# Patient Record
Sex: FEMALE | Race: WHITE | Employment: UNEMPLOYED | URBAN - METROPOLITAN AREA
[De-identification: names, ages, dates, MRNs, and addresses within clinical notes are randomized per-mention and may not be internally consistent; named-entity substitution may affect disease eponyms.]

---

## 2019-03-15 ENCOUNTER — HOSPITAL ENCOUNTER (OUTPATIENT)
Dept: MAMMOGRAPHY | Age: 59
Discharge: HOME | End: 2019-03-17
Payer: COMMERCIAL

## 2019-03-15 DIAGNOSIS — Z12.39 BREAST CANCER SCREENING: ICD-10-CM

## 2019-03-15 PROCEDURE — 77063 BREAST TOMOSYNTHESIS BI: CPT

## 2020-06-19 ENCOUNTER — HOSPITAL ENCOUNTER (OUTPATIENT)
Dept: MAMMOGRAPHY | Age: 60
Discharge: HOME | End: 2020-06-21
Payer: COMMERCIAL

## 2020-06-19 PROCEDURE — 77063 BREAST TOMOSYNTHESIS BI: CPT

## 2024-04-10 PROBLEM — M81.6 LOCALIZED OSTEOPOROSIS WITHOUT CURRENT PATHOLOGICAL FRACTURE: Status: ACTIVE | Noted: 2024-04-10

## 2024-04-10 PROBLEM — Z01.419 WELL WOMAN EXAM WITH ROUTINE GYNECOLOGICAL EXAM: Status: ACTIVE | Noted: 2024-04-10

## 2024-04-10 NOTE — ASSESSMENT & PLAN NOTE
Pap is not indicated by history and age.  Mammogram is ordered.  DEXA is ordered.  Regular exercise and attaining/maintaining a healthy weight is encouraged.   Adequate calcium intake with diet or supplements is encouraged.    We will notify of any abnormal results.

## 2024-04-10 NOTE — PROGRESS NOTES
Subjective   Patient ID: Patricia Sevilla is a 63 y.o. female who presents for Annual Exam (Denies problems.).  3/30/2023 pap and HPV returned negative.   She does have a history of cervical dysplasia treated by cervical conization in 1993. At last visit she requested to have pap and HPV performed and has had negative cervical cancer screening in 2020, 2022 and 2023.     Mammogram returned negative 7/2023.    DEXA 10/2021 returned with osteoporosis in spine with T score -3.6 and osteopenia in hip. This was ordered by her PCP Dr. Keith. She did see a rheumatologist for medication advice but the preferred medicine was not covered.             Review of Systems   Constitutional:  Negative for activity change.   HENT:  Negative for congestion.    Respiratory:  Negative for apnea and cough.    Cardiovascular:  Negative for chest pain.   Gastrointestinal:  Negative for constipation and diarrhea.   Genitourinary:  Negative for hematuria and vaginal pain.   Musculoskeletal:  Negative for joint swelling.   Neurological:  Negative for dizziness.   Psychiatric/Behavioral:  Negative for agitation.        Past Medical History:   Diagnosis Date    Nontoxic single thyroid nodule     Solitary thyroid nodule      Past Surgical History:   Procedure Laterality Date    HAND SURGERY  07/14/2017    Hand Surgery                                                                                                                                                          OTHER SURGICAL HISTORY  10/21/2013    Excision Of Cystic Hygroma    OTHER SURGICAL HISTORY  10/13/2020    Loop electrosurgical excision procedure      Allergies   Allergen Reactions    Sulfa (Sulfonamide Antibiotics) Nausea And Vomiting and Nausea/vomiting     Other reaction(s): Vomiting    Codeine Itching, Swelling and Rash     Eyes swelling    Sulfamethoxazole-Trimethoprim Nausea/vomiting      Current Outpatient Medications on File Prior to Visit   Medication Sig Dispense Refill     calcium-D3-zinc-copper-angelito 325 mg-12.5 mcg -2.75 mg tablet Calcium       No current facility-administered medications on file prior to visit.        Objective   Physical Exam  Constitutional:       Appearance: Normal appearance.   Neck:      Thyroid: No thyromegaly.   Cardiovascular:      Rate and Rhythm: Normal rate and regular rhythm.      Heart sounds: Normal heart sounds.   Pulmonary:      Effort: Pulmonary effort is normal.      Breath sounds: Normal breath sounds.   Chest:      Chest wall: No mass.   Breasts:     Right: Normal. No inverted nipple, mass, nipple discharge or skin change.      Left: Normal. No inverted nipple, mass, nipple discharge or skin change.   Abdominal:      General: There is no distension.      Palpations: Abdomen is soft. There is no mass.      Tenderness: There is no abdominal tenderness.   Genitourinary:     General: Normal vulva.      Exam position: Lithotomy position.      Labia:         Right: No rash.         Left: No rash.       Urethra: No urethral lesion.      Vagina: Normal. No lesions.      Cervix: No friability or lesion.      Uterus: Normal. Not enlarged and not tender.       Adnexa: Right adnexa normal and left adnexa normal.        Right: No mass or tenderness.          Left: No mass or tenderness.        Comments: Atrophy is noted.   Musculoskeletal:         General: No deformity.      Cervical back: Neck supple.   Lymphadenopathy:      Cervical: No cervical adenopathy.   Skin:     General: Skin is warm and dry.      Findings: No rash.   Neurological:      General: No focal deficit present.      Mental Status: She is alert.   Psychiatric:         Mood and Affect: Mood normal.         Behavior: Behavior is cooperative.         Thought Content: Thought content normal.           Problem List Items Addressed This Visit       Localized osteoporosis without current pathological fracture    Overview     DEXA 10/3/2021:   Femoral neck LEFT T-score: -2.3. This falls within  the osteopenic range.   Total Hip LEFT T-score: -1.7. This falls within the osteopenic range.   Spine: L1-L4 T-score: -3.6. This falls within the osteoporotic range.                                                       Current Assessment & Plan     Repeat DEXA is ordered. Weight bearing exercise along with vitamin D and calcium intake is encouraged. She was given information on Prolia.          Relevant Orders    XR DEXA bone density    Well woman exam with routine gynecological exam - Primary    Overview     3/30/2023 pap and HPV returned negative.   She does have a history of cervical dysplasia treated by cervical conization in 1993.  Mammogram returned negative 7/2023.  DEXA 10/2021 returned with osteoporosis in spine with T score -3.6 and osteopenia in hip.           Current Assessment & Plan     Pap is not indicated by history and age.  Mammogram is ordered.  DEXA is ordered.  Regular exercise and attaining/maintaining a healthy weight is encouraged.   Adequate calcium intake with diet or supplements is encouraged.    We will notify of any abnormal results.           Other Visit Diagnoses       Breast cancer screening by mammogram        Relevant Orders    BI mammo bilateral screening tomosynthesis

## 2024-04-11 ENCOUNTER — OFFICE VISIT (OUTPATIENT)
Dept: OBSTETRICS AND GYNECOLOGY | Facility: CLINIC | Age: 64
End: 2024-04-11
Payer: COMMERCIAL

## 2024-04-11 VITALS
SYSTOLIC BLOOD PRESSURE: 116 MMHG | BODY MASS INDEX: 20.96 KG/M2 | WEIGHT: 111 LBS | DIASTOLIC BLOOD PRESSURE: 78 MMHG | HEIGHT: 61 IN

## 2024-04-11 DIAGNOSIS — M81.6 LOCALIZED OSTEOPOROSIS WITHOUT CURRENT PATHOLOGICAL FRACTURE: ICD-10-CM

## 2024-04-11 DIAGNOSIS — Z01.419 WELL WOMAN EXAM WITH ROUTINE GYNECOLOGICAL EXAM: Primary | ICD-10-CM

## 2024-04-11 DIAGNOSIS — Z12.31 BREAST CANCER SCREENING BY MAMMOGRAM: ICD-10-CM

## 2024-04-11 PROCEDURE — 99396 PREV VISIT EST AGE 40-64: CPT | Performed by: OBSTETRICS & GYNECOLOGY

## 2024-04-11 PROCEDURE — 1036F TOBACCO NON-USER: CPT | Performed by: OBSTETRICS & GYNECOLOGY

## 2024-04-11 RX ORDER — ADHESIVE BANDAGE 7/8"
BANDAGE TOPICAL
COMMUNITY

## 2024-04-11 ASSESSMENT — ENCOUNTER SYMPTOMS
COUGH: 0
ACTIVITY CHANGE: 0
AGITATION: 0
APNEA: 0
DIZZINESS: 0
HEMATURIA: 0
CONSTIPATION: 0
JOINT SWELLING: 0
DIARRHEA: 0

## 2024-04-11 NOTE — ASSESSMENT & PLAN NOTE
Repeat DEXA is ordered. Weight bearing exercise along with vitamin D and calcium intake is encouraged. She was given information on Prolia.

## 2025-05-04 NOTE — ASSESSMENT & PLAN NOTE
DEXA is declined. Medication is declined. Regular exercise and attaining/maintaining a healthy weight is encouraged.   Adequate calcium intake with diet or supplements is encouraged.

## 2025-05-04 NOTE — ASSESSMENT & PLAN NOTE
Further pap testing is not indicated by age and history of no dysplasia for over 20 years.  Mammogram is due in August 2025.  DEXA is declined.  Regular exercise and attaining/maintaining a healthy weight is encouraged.   Adequate calcium intake with diet or supplements is encouraged.    We will notify of any abnormal results.

## 2025-05-04 NOTE — PROGRESS NOTES
Subjective   Patient ID: Patricia Cox is a 64 y.o. female who presents for Annual Exam.  She presents for annual exam. 3/30/2023 pap and HPV returned negative.   She does have a history of cervical dysplasia treated by cervical conization in 1993. She has had negative cervical cancer screening in 2020, 2022 and 2023. No further pap tests are indicated.    Mammogram returned negative on 8/3/2024.    DEXA 10/2021 returned with osteoporosis in spine with T score -3.6 and osteopenia in hip. This was ordered by her PCP Dr. Keith. She did see a rheumatologist for medication advice but the preferred medicine was not covered.   DEXA was ordered at last year's annual exam with me but she has not had this performed. She states she is not interested in taking any medication for osteoporosis and also declines follow up testing.     She did recently have a small bump like a pimple in the left groin which resolved. She feels a new one on the right.             Review of Systems   Constitutional:  Negative for activity change.   HENT:  Negative for congestion.    Respiratory:  Negative for apnea and cough.    Cardiovascular:  Negative for chest pain.   Gastrointestinal:  Negative for constipation and diarrhea.   Genitourinary:  Negative for hematuria and vaginal pain.   Musculoskeletal:  Negative for joint swelling.   Neurological:  Negative for dizziness.   Psychiatric/Behavioral:  Negative for agitation.        Medical History[1]   Surgical History[2]   RX Allergies[3]   Medications Ordered Prior to Encounter[4]     Objective   Physical Exam  Constitutional:       Appearance: Normal appearance.   Neck:      Thyroid: No thyromegaly.   Cardiovascular:      Rate and Rhythm: Normal rate and regular rhythm.      Heart sounds: Normal heart sounds.   Pulmonary:      Effort: Pulmonary effort is normal.      Breath sounds: Normal breath sounds.   Chest:      Chest wall: No mass.   Breasts:     Right: Normal. No inverted  nipple, mass, nipple discharge or skin change.      Left: Normal. No inverted nipple, mass, nipple discharge or skin change.   Abdominal:      General: There is no distension.      Palpations: Abdomen is soft. There is no mass.      Tenderness: There is no abdominal tenderness.   Genitourinary:     General: Normal vulva.      Exam position: Lithotomy position.      Labia:         Right: No rash.         Left: No rash.       Urethra: No urethral lesion.      Vagina: Normal. No lesions.      Cervix: No friability or lesion.      Uterus: Normal. Not enlarged and not tender.       Adnexa: Right adnexa normal and left adnexa normal.        Right: No mass or tenderness.          Left: No mass or tenderness.            Comments: 1 cm red round lesion consistent with healing furuncle.   Atrophy is noted.  Musculoskeletal:         General: No deformity.      Cervical back: Neck supple.   Lymphadenopathy:      Cervical: No cervical adenopathy.   Skin:     General: Skin is warm and dry.      Findings: No rash.   Neurological:      General: No focal deficit present.      Mental Status: She is alert.   Psychiatric:         Mood and Affect: Mood normal.         Behavior: Behavior is cooperative.         Thought Content: Thought content normal.           Problem List Items Addressed This Visit          Medium    Localized osteoporosis without current pathological fracture    Overview   DEXA 10/3/2021:   Femoral neck LEFT T-score: -2.3. This falls within the osteopenic range.   Total Hip LEFT T-score: -1.7. This falls within the osteopenic range.   Spine: L1-L4 T-score: -3.6. This falls within the osteoporotic range.   She did see a rheumatologist for medication advice but the preferred medicine was not covered by her insurance.                                                      Current Assessment & Plan   DEXA is declined. Medication is declined. Regular exercise and attaining/maintaining a healthy weight is encouraged.    Adequate calcium intake with diet or supplements is encouraged.             Well woman exam with routine gynecological exam - Primary    Overview   3/30/2023 pap and HPV returned negative.   She does have a history of cervical dysplasia treated by cervical conization in 1993.  Mammogram returned negative 8/3/2024.  Colonoscopy was in 2023 with recommendation again in 10 years.  DEXA 10/2021 returned with osteoporosis in spine with T score -3.6 and osteopenia in hip. She declines further testing and medication.           Current Assessment & Plan   Further pap testing is not indicated by age and history of no dysplasia for over 20 years.  Mammogram is due in August 2025.  DEXA is declined.  Regular exercise and attaining/maintaining a healthy weight is encouraged.   Adequate calcium intake with diet or supplements is encouraged.    We will notify of any abnormal results.                          [1]   Past Medical History:  Diagnosis Date    Abnormal Pap smear of cervix     Migraine     Nontoxic single thyroid nodule     Solitary thyroid nodule   [2]   Past Surgical History:  Procedure Laterality Date    COLPOSCOPY  1992    HAND SURGERY  07/14/2017    Hand Surgery                                                                                                                                                          OTHER SURGICAL HISTORY  10/21/2013    Excision Of Cystic Hygroma    OTHER SURGICAL HISTORY  10/13/2020    Loop electrosurgical excision procedure   [3]   Allergies  Allergen Reactions    Sulfa (Sulfonamide Antibiotics) Nausea And Vomiting and Nausea/vomiting     Other reaction(s): Vomiting    Codeine Itching, Swelling and Rash     Eyes swelling    Sulfamethoxazole-Trimethoprim Nausea/vomiting   [4]   Current Outpatient Medications on File Prior to Visit   Medication Sig Dispense Refill    cholecalciferol, vitamin D3, (VITAMIN D3 ORAL)       calcium-D3-zinc-copper-angelito 325 mg-12.5 mcg -2.75 mg tablet  Calcium       No current facility-administered medications on file prior to visit.

## 2025-05-07 ENCOUNTER — APPOINTMENT (OUTPATIENT)
Dept: OBSTETRICS AND GYNECOLOGY | Facility: CLINIC | Age: 65
End: 2025-05-07
Payer: COMMERCIAL

## 2025-05-07 VITALS
SYSTOLIC BLOOD PRESSURE: 118 MMHG | BODY MASS INDEX: 21.3 KG/M2 | WEIGHT: 112.8 LBS | DIASTOLIC BLOOD PRESSURE: 86 MMHG | HEIGHT: 61 IN

## 2025-05-07 DIAGNOSIS — Z01.419 WELL WOMAN EXAM WITH ROUTINE GYNECOLOGICAL EXAM: Primary | ICD-10-CM

## 2025-05-07 DIAGNOSIS — M81.6 LOCALIZED OSTEOPOROSIS WITHOUT CURRENT PATHOLOGICAL FRACTURE: ICD-10-CM

## 2025-05-07 PROCEDURE — 1159F MED LIST DOCD IN RCRD: CPT | Performed by: OBSTETRICS & GYNECOLOGY

## 2025-05-07 PROCEDURE — 1036F TOBACCO NON-USER: CPT | Performed by: OBSTETRICS & GYNECOLOGY

## 2025-05-07 PROCEDURE — 3008F BODY MASS INDEX DOCD: CPT | Performed by: OBSTETRICS & GYNECOLOGY

## 2025-05-07 PROCEDURE — 1160F RVW MEDS BY RX/DR IN RCRD: CPT | Performed by: OBSTETRICS & GYNECOLOGY

## 2025-05-07 PROCEDURE — 99397 PER PM REEVAL EST PAT 65+ YR: CPT | Performed by: OBSTETRICS & GYNECOLOGY

## 2025-05-07 SDOH — ECONOMIC STABILITY: FOOD INSECURITY: WITHIN THE PAST 12 MONTHS, THE FOOD YOU BOUGHT JUST DIDN'T LAST AND YOU DIDN'T HAVE MONEY TO GET MORE.: NEVER TRUE

## 2025-05-07 SDOH — ECONOMIC STABILITY: TRANSPORTATION INSECURITY
IN THE PAST 12 MONTHS, HAS THE LACK OF TRANSPORTATION KEPT YOU FROM MEDICAL APPOINTMENTS OR FROM GETTING MEDICATIONS?: NO

## 2025-05-07 SDOH — ECONOMIC STABILITY: FOOD INSECURITY: WITHIN THE PAST 12 MONTHS, YOU WORRIED THAT YOUR FOOD WOULD RUN OUT BEFORE YOU GOT MONEY TO BUY MORE.: NEVER TRUE

## 2025-05-07 SDOH — ECONOMIC STABILITY: TRANSPORTATION INSECURITY
IN THE PAST 12 MONTHS, HAS LACK OF TRANSPORTATION KEPT YOU FROM MEETINGS, WORK, OR FROM GETTING THINGS NEEDED FOR DAILY LIVING?: NO

## 2025-05-07 ASSESSMENT — ENCOUNTER SYMPTOMS
CONSTIPATION: 0
ACTIVITY CHANGE: 0
JOINT SWELLING: 0
HEMATURIA: 0
APNEA: 0
COUGH: 0
AGITATION: 0
DIZZINESS: 0
DIARRHEA: 0

## 2026-06-04 ENCOUNTER — APPOINTMENT (OUTPATIENT)
Dept: OBSTETRICS AND GYNECOLOGY | Facility: CLINIC | Age: 66
End: 2026-06-04
Payer: MEDICARE